# Patient Record
Sex: FEMALE | NOT HISPANIC OR LATINO | URBAN - METROPOLITAN AREA
[De-identification: names, ages, dates, MRNs, and addresses within clinical notes are randomized per-mention and may not be internally consistent; named-entity substitution may affect disease eponyms.]

---

## 2024-10-14 DIAGNOSIS — E72.4 OTC (ORNITHINE TRANSCARBAMYLASE DEFICIENCY) (MULTI): Primary | ICD-10-CM

## 2024-10-15 ENCOUNTER — APPOINTMENT (OUTPATIENT)
Dept: PSYCHOLOGY | Facility: CLINIC | Age: 54
End: 2024-10-15

## 2024-10-15 ENCOUNTER — HOSPITAL ENCOUNTER (OUTPATIENT)
Dept: RESEARCH | Facility: HOSPITAL | Age: 54
Discharge: HOME | End: 2024-10-15

## 2024-10-15 VITALS
TEMPERATURE: 97.7 F | SYSTOLIC BLOOD PRESSURE: 112 MMHG | WEIGHT: 126.32 LBS | HEIGHT: 64 IN | BODY MASS INDEX: 21.57 KG/M2 | HEART RATE: 70 BPM | DIASTOLIC BLOOD PRESSURE: 75 MMHG | RESPIRATION RATE: 18 BRPM

## 2024-10-15 DIAGNOSIS — E72.20 UREA CYCLE METABOLISM DISORDER (MULTI): Primary | ICD-10-CM

## 2024-10-15 DIAGNOSIS — E72.4 OTC (ORNITHINE TRANSCARBAMYLASE DEFICIENCY) (MULTI): ICD-10-CM

## 2024-10-15 LAB
ALBUMIN SERPL BCP-MCNC: 4.4 G/DL (ref 3.4–5)
ALP SERPL-CCNC: 83 U/L (ref 33–110)
ALT SERPL W P-5'-P-CCNC: 13 U/L (ref 7–45)
AMMONIA PLAS-SCNC: 46 UMOL/L (ref 16–53)
ANION GAP SERPL CALC-SCNC: 11 MMOL/L (ref 10–20)
APTT PPP: 33 SECONDS (ref 27–38)
AST SERPL W P-5'-P-CCNC: 19 U/L (ref 9–39)
BILIRUB DIRECT SERPL-MCNC: 0.1 MG/DL (ref 0–0.3)
BILIRUB SERPL-MCNC: 0.8 MG/DL (ref 0–1.2)
BUN SERPL-MCNC: 12 MG/DL (ref 6–23)
CALCIUM SERPL-MCNC: 9.7 MG/DL (ref 8.6–10.6)
CHLORIDE SERPL-SCNC: 104 MMOL/L (ref 98–107)
CHOLEST SERPL-MCNC: 185 MG/DL (ref 0–199)
CK SERPL-CCNC: 153 U/L (ref 0–215)
CO2 SERPL-SCNC: 30 MMOL/L (ref 21–32)
CREAT SERPL-MCNC: 0.67 MG/DL (ref 0.5–1.05)
EGFRCR SERPLBLD CKD-EPI 2021: >90 ML/MIN/1.73M*2
ERYTHROCYTE [DISTWIDTH] IN BLOOD BY AUTOMATED COUNT: 13 % (ref 11.5–14.5)
FIBRINOGEN PPP-MCNC: 249 MG/DL (ref 200–400)
GGT SERPL-CCNC: 17 U/L (ref 5–55)
GLUCOSE SERPL-MCNC: 95 MG/DL (ref 74–99)
HCT VFR BLD AUTO: 36.2 % (ref 36–46)
HGB BLD-MCNC: 11.9 G/DL (ref 12–16)
INR PPP: 1 (ref 0.9–1.1)
LDH SERPL L TO P-CCNC: 157 U/L (ref 84–246)
MAGNESIUM SERPL-MCNC: 2.16 MG/DL (ref 1.6–2.4)
MCH RBC QN AUTO: 28.7 PG (ref 26–34)
MCHC RBC AUTO-ENTMCNC: 32.9 G/DL (ref 32–36)
MCV RBC AUTO: 87 FL (ref 80–100)
MUCOUS THREADS #/AREA URNS AUTO: ABNORMAL /LPF
NRBC BLD-RTO: 0 /100 WBCS (ref 0–0)
PHOSPHATE SERPL-MCNC: 3.9 MG/DL (ref 2.5–4.9)
PLATELET # BLD AUTO: 217 X10*3/UL (ref 150–450)
POTASSIUM SERPL-SCNC: 4.8 MMOL/L (ref 3.5–5.3)
PREALB SERPL-MCNC: 23.6 MG/DL (ref 18–40)
PROT SERPL-MCNC: 7.2 G/DL (ref 6.4–8.2)
PROTHROMBIN TIME: 11 SECONDS (ref 9.8–12.8)
RBC # BLD AUTO: 4.15 X10*6/UL (ref 4–5.2)
RBC #/AREA URNS AUTO: ABNORMAL /HPF
SODIUM SERPL-SCNC: 140 MMOL/L (ref 136–145)
TRIGL SERPL-MCNC: 54 MG/DL (ref 0–149)
URATE SERPL-MCNC: 3.2 MG/DL (ref 2.3–6.7)
WBC # BLD AUTO: 3.3 X10*3/UL (ref 4.4–11.3)
WBC #/AREA URNS AUTO: ABNORMAL /HPF

## 2024-10-15 PROCEDURE — 81001 URINALYSIS AUTO W/SCOPE: CPT | Performed by: MEDICAL GENETICS

## 2024-10-15 PROCEDURE — 84478 ASSAY OF TRIGLYCERIDES: CPT | Performed by: MEDICAL GENETICS

## 2024-10-15 PROCEDURE — 82139 AMINO ACIDS QUAN 6 OR MORE: CPT | Performed by: MEDICAL GENETICS

## 2024-10-15 PROCEDURE — 82140 ASSAY OF AMMONIA: CPT | Performed by: MEDICAL GENETICS

## 2024-10-15 PROCEDURE — 85027 COMPLETE CBC AUTOMATED: CPT | Performed by: MEDICAL GENETICS

## 2024-10-15 PROCEDURE — 85610 PROTHROMBIN TIME: CPT | Performed by: MEDICAL GENETICS

## 2024-10-15 PROCEDURE — 85384 FIBRINOGEN ACTIVITY: CPT | Performed by: MEDICAL GENETICS

## 2024-10-15 PROCEDURE — 85730 THROMBOPLASTIN TIME PARTIAL: CPT | Performed by: MEDICAL GENETICS

## 2024-10-15 PROCEDURE — 83921 ORGANIC ACID SINGLE QUANT: CPT | Performed by: MEDICAL GENETICS

## 2024-10-15 PROCEDURE — 84134 ASSAY OF PREALBUMIN: CPT | Performed by: MEDICAL GENETICS

## 2024-10-15 NOTE — RESEARCH NOTES
[facility]  Study Name:  A Longitudinal Study of Urea Cycle Disorders (UCDC)  IRB#:  [id number]  [facility]#:  [id number]  Protocol Version Dated:  23-Mar-2020  PI:  Chiquita Dawn    Time point: Blood Draw Visits    Encounter Date: 10/15/2024  Encounter Time:  9:00 AM EDT  Encounter Department: [facility] HEMATOLOGY AND ONCOLOGY     #1    Phone Pager   Nawaf Batista [phone]    In the event of an emergency if the covering physician cannot be reached page the Metabolic Service Pager @ [phone]    Visit Provider: [id number], [facility] ROOM 13 [facility]   Nadya Mcgill is a 54 y.o. female and is here for a Research clinical visit.    Allergies: Not on File    Study Regimen and Dosing   The purpose of this study is to perform a long-term follow-up of a large group of patients with the various urea cycle disorders, (UCD). Urea cycle disorders are a group of rare inborn errors of metabolism that commonly present in childhood with episodes of vomiting, lethargy and coma. Symptoms are the result of an accumulation of ammonia, a toxic product of protein degradation, which is not adequately metabolized in the liver of affected individuals due to an enzyme deficiency present from birth. This study will assess biochemical status, growth and cognitive function over time, as well as evaluation of treatment outcomes of the two most commonly used forms of treatment, alternate pathway therapy and liver transplantation. The overall goal of this study is to improve treatment and outcomes of this devastating group of disorders.     Dietary Guidelines   Patient will arrive at [facility] fasting for blood draw.  Do not give patient any food unless approved by parent or dietician.     Prior to Starting Study Activities    will obtain consent if not already obtained prior to any study procedures.   If patient is taking an Alternate Pathway Med, Nursing will obtain date and time  "of last dose   Neuropsychological testing visits: DCRU RN will set up participant's room with a large round table and 2 straight back chairs (workroom chairs). Place sign on door of patient's room stating 'Quiet-Testing in Progress'  Perform venipuncture for sample collection procedure.      Objective   Vital Signs:   Vitals:    10/15/24 0937   BP: 112/75   Pulse: 70   Resp: 18   Temp: 36.5 °C (97.7 °F)   TempSrc: Oral   Weight: 57.3 kg (126 lb 5.2 oz)   Height: 1.624 m (5' 3.94\")       ASSESSMENT and PLAN:  Problem List Items Addressed This Visit    None  Visit Diagnoses       OTC (ornithine transcarbamylase deficiency) (Multi)        Relevant Orders    Amino Acids, Plasma by LC-MS/MS    Amino Acids, Urine, Quantitative    Ammonia (Completed)    aPTT (Completed)    CBC (Completed)    Fibrinogen (Completed)    Orotic Acid, Random Urine    Prealbumin (Completed)    Protime-INR (Completed)    Study 23 Panel (Completed)    Microscopic Only, Urine (Completed)            Medications as of the completion of today's visit:      Orders placed during today's visit:  Orders Placed This Encounter   Procedures    Amino Acids, Plasma by LC-MS/MS     Standing Status:   Standing     Number of Occurrences:   1     Order Specific Question:   Release result to MyChart     Answer:   Immediate [1]    Amino Acids, Urine, Quantitative     Standing Status:   Standing     Number of Occurrences:   1     Order Specific Question:   Release result to MyChart     Answer:   Immediate [1]    Ammonia     Standing Status:   Standing     Number of Occurrences:   1     Order Specific Question:   Release result to MyChart     Answer:   Immediate [1]    aPTT     Standing Status:   Standing     Number of Occurrences:   1     Order Specific Question:   Release result to MyChart     Answer:   Immediate [1]    CBC     Standing Status:   Standing     Number of Occurrences:   1     Order Specific Question:   Release result to MyChart     Answer:   Immediate " [1]    Fibrinogen     Standing Status:   Standing     Number of Occurrences:   1     Order Specific Question:   Release result to MyChart     Answer:   Immediate [1]    Orotic Acid, Random Urine     Standing Status:   Standing     Number of Occurrences:   1     Order Specific Question:   Release result to MyChart     Answer:   Immediate [1]    Prealbumin     Standing Status:   Standing     Number of Occurrences:   1     Order Specific Question:   Release result to MyChart     Answer:   Immediate [1]    Protime-INR     Standing Status:   Standing     Number of Occurrences:   1     Order Specific Question:   Release result to MyChart     Answer:   Immediate [1]    Study 23 Panel     Standing Status:   Standing     Number of Occurrences:   1     Order Specific Question:   Release result to MyChart     Answer:   Immediate [1]    Microscopic Only, Urine     Standing Status:   Standing     Number of Occurrences:   1     Order Specific Question:   Release result to MyChart     Answer:   Immediate [1]    Adult diet Regular     Standing Status:   Standing     Number of Occurrences:   1     Order Specific Question:   Diet type     Answer:   Regular       [id number] - UCDC 5101    Patient has no adverse events documented in the Research Adverse Events activity.      Sequence of Testing   Perform study visit activities in this order:  Blood Draw (all labs may not be ordered)  Height, Weight, Vital Signs. Head Circumference, if required  Neuropsychological testing, if required  Physical Exam     Optional Alternative Pathway Medication    Time of Last Dose:      10- 9 am     Clinical Lab  10:14    will provide completed requisitions required for this visit  Blood must be obtained before the next dose of the Alternate Pathway Med  Patient must be fasting at least 3 hours prior to blood draw  (na)   Specimen Test        **All labs are optional** Tube Handling   Ammonia- If ordered, obtain this blood first.    Blood needs to be drawn flowing well (best without tourniquet) or minimize time that tourniquet is on as this may affect ammonia level. Ideally, place tourniquet on, identify vein, insert needle of butterfly then remove tourniquet while drawing this blood. You may replace tourniquet to draw remaining bloods. 1 mL  Lithium Heparin Green Top Tube On Ice. Deliver STAT -  Lab must process within 30 minutes   Plasma amino acids 1.5 mL  Lithium Heparin Green Top Tube  STAT   Study 23 Panel, pre-albumin 4 mL  SST STAT    CBC (fill tube to required amount) 2 mL  EDTA STAT    PT/INR, APTT, Fibrinogen - (fill tube to required amount) 2.7 mL  one FULL   Sodium Citrate STAT    Urinalysis Collection Cup STAT      Research Correlatives   As Ordered   Specimen Test Tube Handling Time   Serum (2 mL minimum, preferably 3 mL) 4 mL  SST RT, invert 5X Not needed   Spot Urine, 6 mL (minimum) Collection Cup Place on ice 10:14     Vital Signs   Document following Vitals:  BP      Temperature     Pulse Oximetry      Respirations          Head Circumference   If requested, Head Circumference in cm: na     Discharge Instructions   Patient may be discharged to home after study requirements completed or PK sample obtained.     Neela Clayton, RN  10/15/24

## 2024-10-15 NOTE — PROGRESS NOTES
[facility] 5101 Longitudinal Urea Cycle Disorders Study      Nadya Mcgill is a 54 y.o. female with ornithine transcarbamylase (OTC) deficiency here for the [facility] 5101 Longitudinal Study. Her last visit to our site was in 2015.    HPI:  Mrs Mcgill was diagnosed with OTC after the diagnosis of her daughter, Saritha Mcgill.     - No hospitalizations realted to hyperammonemia  - No ammonia scavengers at this time  - Takes L-citrulline 2 g PO TID (6 g total per day)  - Limits protein intake to 35 g per day from food  - No metabolic formula or essential amino acids supplementation  - Sees Dr Lucas Matthew in the Division of Genetics and Metabolics at [facility] in [facility]    PMHx:  She reports that in retrospect, she always had some degree of protein aversion with a lower appetite. She previously saw Dr Patricia Jones. Mrs. Mcgill trialed Ravicti (HPN-100) but had GI side effects and began hallucinating on this medication so discontinued after 2 weeks. Her highest recorded glutamine we have in our records was in the 800-900 umol/L range. Generally has run in the 700-800 umol/L range. Has not tried any of the newer ammonia scavenger medications like Pheburane or Olpruva. Did not take Buphenyl.      More recently, she has been experiencing increasing migraines. She had recurrent migraines with menses over the years but the migraines intensified with perimenopause. She started monitoring protein more closely and decrease protein intake to 35 g per day and this seemed to help reduce the migraines.    She has had an abdominal ultrasound and this was normal (so no evidence of liver disease).     In 8/2024, she went to a reunion for 3 days and ate more than usual including meat. She was vomiting and went to the ED the day after the reunion concluded. This was her first visit to the ED due to concerns that her ammonia may be elevated. Her ammonia was normal by the time it was checked in the ED. Did not have VANNESSA  done. She went [facility] (a [facility] ED[facility] Since she did not need to be admitted she did not need to be transferred to an adult facility.    She also reports that she also has a history of difficulty swallowing. This has been attributed due to enlargement of her thyroid or glands. This happens on and off with intermittent swelling of these structures. She saw a thyroid specialist 10 years ago. She did not require any treatment and does not take any thyroid medications.    Review of Systems:  General: some protein aversion, lower appetite. Sometimes does not sleep well due to bursitis associated discomfort. She typically sleeps for about 5-6 hours per night.  Eyes: wears glasses  Hearing: no concerns  ENT: no problems except intermittent swelling of thyroid or glands that lead to some difficulty with swallowing (cause is unclear)  Resp: no problems currently but has an Albuterol MDI due to hx of asthma  Breast: L breast tender; needs to repeat mammogram of L breast. May be due to hormonal changes of perimenopause but will have it checked again just in case.  CV: no problems  GI: GERD; no constipation, diarrhea, or abdominal pain  G/U: Perimenopausal- spotting occasionally but no periods. Had renal stones in the past but not a recent problem.  Skin:  precancerous mole removed (premelanoma) in July 2024; had to have an initial biopsy then return for a revision.  Endo: intermittent thyroid (or glandular) swelling that affects swallowing; no problems with thyroid function (not on any medication for this); normal blood glucose  Musculoskeletal: Pain in left shoulder due to bursitis (needs to do PT). Also tore tissue in right shoulder and lead to her over compensating with left arm (leading to bursitis). Also has arthritis. She needs PT for both arms.  Neuro: Migraines (increased with perimenopause). Also notices more eye strain at work and that can trigger headaches. Some migrains triggered by not  "sleeping properly. Sometimes the headache lasts all day.  Psych: no reported problems    Family Hx:  Daughter (Saritha) 21 y/o- OTC deficiency s/p liver transplant in 2016  Mother- suspected that she may have had OTC deficiency with symptomatic hyperammonemia given symptoms that were assumed to be psychiatric in the [place]   Sister (35 y)- +OTC  Sister (46 y)- +OTC    Cancer also runs in the family     Objective   Visit Vitals  /75 (BP Location: Right arm)   Pulse 70   Temp 36.5 °C (97.7 °F) (Oral)   Resp 18   Ht 1.624 m (5' 3.94\")   Wt 57.3 kg (126 lb 5.2 oz)   BMI 21.73 kg/m²   BSA 1.61 m²      Physical Exam  General: NAD, cooperative, A&Ox3.  HEENT: NC/AT. PERRLA, EOMI. Ears normally formed; TM's clear bilaterally. No dysmorphic facial features. MMM and pink. OP clear. Palate intact. Dentition normal.   Neck: Supple, no LAD. Thyroid is normal  Chest: No deformities. Symmetric.   Respiratory: CTAB s C/W/R/R, no distress.  CV: RRR with normal S1/S2. No M/C/G/R. CR<2s. WWP.  Abdomen: Soft, nontender, nondistended, bowel sounds present in all quadrants. No HSM, no masses.   G/U: N/E  Extremities: FROM, symmetric and normally formed upper and lower extremities. Normal hands and feet. WWP. CR<2s. No E/C/C.  Skin: No rashes or lesions.   Neurologic: CN 2-12 are grossly intact. No focal deficits. Muscle strength is 5/5 throughout. DTR's 2/4 throughout. Gait is normal. Sensation normal. Coordination normal. Normal proprioception.  Back: no curvatures or defects    Medications:  L-citrulline 2 g PO TID (6 g total per day)  Albuterol MDI prn wheezing  Vitamin D3 25 mg PO daily  Vitamin C 1000 mg PO daily  EpiPen prn allergic reaction    Allergies:  Penicillin  Seafood      Relevant Results  Results for orders placed or performed during the hospital encounter of 10/15/24 (from the past 24 hour(s))   aPTT   Result Value Ref Range    aPTT 33 27 - 38 seconds   CBC   Result Value Ref Range    WBC 3.3 (L) 4.4 - 11.3 " x10*3/uL    nRBC 0.0 0.0 - 0.0 /100 WBCs    RBC 4.15 4.00 - 5.20 x10*6/uL    Hemoglobin 11.9 (L) 12.0 - 16.0 g/dL    Hematocrit 36.2 36.0 - 46.0 %    MCV 87 80 - 100 fL    MCH 28.7 26.0 - 34.0 pg    MCHC 32.9 32.0 - 36.0 g/dL    RDW 13.0 11.5 - 14.5 %    Platelets 217 150 - 450 x10*3/uL   Fibrinogen   Result Value Ref Range    Fibrinogen 249 200 - 400 mg/dL   Prealbumin   Result Value Ref Range    Prealbumin 23.6 18.0 - 40.0 mg/dL   Protime-INR   Result Value Ref Range    Protime 11.0 9.8 - 12.8 seconds    INR 1.0 0.9 - 1.1   Study 23 Panel   Result Value Ref Range    Glucose 95 74 - 99 mg/dL    Urea Nitrogen 12 6 - 23 mg/dL    Sodium 140 136 - 145 mmol/L    Potassium 4.8 3.5 - 5.3 mmol/L    Chloride 104 98 - 107 mmol/L    Bicarbonate 30 21 - 32 mmol/L    Anion Gap 11 10 - 20 mmol/L    Calcium 9.7 8.6 - 10.6 mg/dL    Phosphorus 3.9 2.5 - 4.9 mg/dL    Magnesium 2.16 1.60 - 2.40 mg/dL    Creatinine 0.67 0.50 - 1.05 mg/dL    eGFR >90 >60 mL/min/1.73m*2    Uric Acid 3.2 2.3 - 6.7 mg/dL    AST 19 9 - 39 U/L     84 - 246 U/L    ALT 13 7 - 45 U/L    GGT 17 5 - 55 U/L    Alkaline Phosphatase 83 33 - 110 U/L    Bilirubin, Total 0.8 0.0 - 1.2 mg/dL    Bilirubin, Direct 0.1 0.0 - 0.3 mg/dL    Total Protein 7.2 6.4 - 8.2 g/dL    Albumin 4.4 3.4 - 5.0 g/dL    Cholesterol 185 0 - 199 mg/dL    Triglycerides 54 0 - 149 mg/dL    Creatine Kinase 153 0 - 215 U/L   Microscopic Only, Urine   Result Value Ref Range    WBC, Urine 6-10 (A) 1-5, NONE /HPF    RBC, Urine NONE NONE, 1-2, 3-5 /HPF    Mucus, Urine FEW Reference range not established. /LPF      Assessment/Plan   Nadya Mcgill is a 54 y.o. female with ornithine transcarbamylase (OTC) deficiency here for the [facility] 5101 Longitudinal Study. Her last visit to our site was in 2015.    Mrs Mcgill was diagnosed with OTC after the diagnosis of her daughter, Saritha Mcgill.     - No hospitalizations realted to hyperammonemia  - Highest glutamine known was in the 800-900 umol/L range  (usually runs between 700-800 umol/L)  - No ammonia scavengers at this time (had hallucinations and GI side effects after 2 weeks on Ravicti (HPN-100)  - Takes L-citrulline 2 g PO TID (6 g total per day)  - Limits protein intake to 35 g per day from food  - No metabolic formula or essential amino acids supplementation  - Sees Dr Lucas Matthew in the Division of Genetics and Metabolics at [facility] in [facility]    Plan:  1) [place] [address] labs collected (blood and urine)  2) Neuropsychiatric testing with Dr Huang for the [place] [address] study  3) For clinical care, Mrs Mcgill will follow-up with Dr Matthew at [facility]   4) Next study visit will be in 1 year     Chiquita Dawn MD    Total time: 80 minutes

## 2024-10-17 LAB
(HCYS)2 SERPL QL: <5 UMOL/L
A-AMINOBUTYR SERPL QL: 29.5 UMOL/L
AAA SERPL-SCNC: <5 UMOL/L
ALANINE SERPL-SCNC: 289.3 UMOL/L (ref 160–530)
ALLOISOLEUCINE SERPL QL: <5 UMOL/L
ANSERINE SERPL-SCNC: <20 UMOL/L
ARGININE SERPL-SCNC: 45 UMOL/L (ref 35–125)
ARGININOSUCCINATE SERPL-SCNC: <20 UMOL/L
ASPARAGINE/CREAT UR-RTO: 42.6 UMOL/L (ref 20–80)
ASPARTATE SERPL-SCNC: <5 UMOL/L
B-AIB SERPL-SCNC: <5 UMOL/L
B-ALANINE SERPL-SCNC: <5 UMOL/L
CITRULLINE SERPL-SCNC: 22.6 UMOL/L (ref 10–45)
CYSTATHIONIN SERPL-SCNC: <5 UMOL/L
CYSTINE SERPL-SCNC: 51.1 UMOL/L (ref 10–65)
ETHANOLAMINE SERPL-SCNC: <5 UMOL/L
GABA SERPL-SCNC: <5 UMOL/L
GLUTAMATE SERPL-SCNC: 28.2 UMOL/L (ref 15–130)
GLUTAMINE SERPL-SCNC: 850.3 UMOL/L (ref 380–680)
GLYCINE SERPL-SCNC: 303 UMOL/L (ref 140–420)
HISTIDINE SERPL-SCNC: 57.4 UMOL/L (ref 50–130)
HOMOCITRULLINE SERPL-SCNC: <5 UMOL/L
ISOLEUCINE SERPL-SCNC: 42 UMOL/L (ref 30–120)
LEUCINE SERPL QL: 68.3 UMOL/L (ref 60–180)
LYSINE SERPL-ACNC: 159.8 UMOL/L (ref 85–230)
METHIONINE SERPL-SCNC: 24.4 UMOL/L (ref 15–40)
OH-LYSINE SERPL-SCNC: <5 UMOL/L
OH-PROLINE SERPL-SCNC: 9.2 UMOL/L (ref 5–40)
ORNITHINE SERPL-SCNC: 53.1 UMOL/L (ref 25–110)
PATH REV BLD -IMP: ABNORMAL
PHE SERPL-SCNC: 45 UMOL/L (ref 30–82)
PHE/TYR RATIO: 0.8
PROLINE SERPL-SCNC: 141.8 UMOL/L (ref 90–350)
SARCOSINE SERPL-SCNC: <5 UMOL/L
SERINE/CREAT UR-RTO: 124.7 UMOL/L (ref 60–170)
TAURINE SERPL-SCNC: 49.8 UMOL/L (ref 30–130)
THREONINE SERPL-SCNC: 95 UMOL/L (ref 60–190)
TRYPTOPHAN SERPL QL: 42 UMOL/L (ref 25–80)
TYROSINE SERPL-SCNC: 57.4 UMOL/L (ref 35–110)
VALINE SERPL-SCNC: 155.5 UMOL/L (ref 120–320)

## 2024-10-20 LAB
A-AMINOBUTYR/CREAT UR-RTO: 18 UMOL/G CRT
AAA/CREAT UR-RTO: 25 UMOL/G CRT
ALANINE/CREAT UR-RTO: 196 UMOL/G CRT (ref 60–500)
AMINO ACID PAT UR-IMP: NORMAL
ANSERINE/CREAT UR-RTO: <25 UMOL/G CRT
ARGININE/CREAT UR-RTO: 28 UMOL/G CRT
ARGININOSUCCINATE/CREAT UR-RTO: <10 UMOL/G CRT
ASPARAGINE/CREAT UR-RTO: 67 UMOL/G CRT (ref 25–180)
ASPARTATE/CREAT UR-RTO: <25 UMOL/G CRT
B-AIB/CREAT UR-RTO: 87 UMOL/G CRT
B-ALANINE/CREAT UR-RTO: <125 UMOL/G CRT
CITRULLINE/CREAT UR-RTO: <5 UMOL/G CRT
CREAT UR-MCNC: 57 MG/DL
CYSTATHIONIN/CREAT UR-RTO: <25 UMOL/G CRT
CYSTINE/CREAT UR-RTO: 32 UMOL/G CRT
ETHANOLAMINE/CREAT UR-RTO: 318 UMOL/G CRT (ref 100–510)
GABA/CREAT UR-RTO: <25 UMOL/G CRT
GLUTAMATE/CREAT UR-RTO: <25 UMOL/G CRT
GLUTAMINE/CREAT UR-RTO: 556 UMOL/G CRT (ref 100–665)
GLYCINE/CREAT UR-RTO: 1538 UMOL/G CRT (ref 230–3510)
HISTIDINE/CREAT UR-RTO: 573 UMOL/G CRT (ref 80–1130)
HOMOCITRULLINE/CREAT UR-RTO: <25 UMOL/G CRT
ISOLEUCINE/CREAT UR-RTO: <25 UMOL/G CRT
LEUCINE/CREAT UR-RTO: 15 UMOL/G CRT
LYSINE/CREAT UR-RTO: 66 UMOL/G CRT
METHIONINE/CREAT UR-RTO: <10 UMOL/G CRT
OH-LYSINE/CREAT UR-RTO: <25 UMOL/G CRT
OH-PROLINE/CREAT UR-RTO: <10 UMOL/G CRT
ORNITHINE/CREAT UR-RTO: <25 UMOL/G CRT
PHE/CREAT UR-RTO: 37 UMOL/G CRT (ref 15–85)
PROLINE/CREAT UR-RTO: <10 UMOL/G CRT
SARCOSINE/CREAT UR-RTO: <25 UMOL/G CRT
SERINE/CREAT UR-RTO: 337 UMOL/G CRT (ref 90–470)
TAURINE/CREAT UR-RTO: 895 UMOL/G CRT
THREONINE/CREAT UR-RTO: 92 UMOL/G CRT (ref 25–250)
TRYPTOPHAN/CREAT UR-RTO: 54 UMOL/G CRT (ref 15–95)
TYROSINE/CREAT UR-RTO: 70 UMOL/G CRT (ref 15–150)
VALINE/CREAT UR-RTO: 28 UMOL/G CRT

## 2024-10-22 NOTE — PROGRESS NOTES
Subjective   Patient ID: Nadya Mcgill is a 54 y.o. female who presents for Longitudinal Urea Cycle Disorder Research Study 5101.    Reason For Visit  Nadya participated in a neuropsychological evaluation as part of the Urea Cycle Disorder.     Tests  Nadya was seen for 2.5 hours of testing and scoring and 1 hour of feedback and report writing.     Assessment/Plan   Nadya is a 54 year old woman with a history of Urea Cycle Disorder.   She presented for in person neuropsychological evaluation as part of the UCD Longitudinal Study.  She completed testing and results were shared with her.  A letter will be sent to her and shared with the study sponsor.     Diagnoses and all orders for this visit:  Urea cycle metabolism disorder (Multi)    Direct Time  11:30-12:45 (testing)  1:00-1:15 (scoring)  2:30-3:30 (testing)  3:30-3:45 (feedback)    Indirect Time  45 Report writing    1 unit 54859; 1 unit 20688  1 unit 94507; 2 units 31133  1 unit 89999    Elma Huang, PhD 10/21/24 9:51 PM

## 2024-10-23 LAB
ANNOTATION COMMENT IMP: NORMAL
CREAT UR-MCNC: 57 MG/DL
OROTATE/CREAT UR-SRTO: 1.3 MMOL/MOL CRT

## 2024-11-05 NOTE — ADDENDUM NOTE
Encounter addended by: Agnes Balderas RN on: 11/5/2024 5:31 PM   Actions taken: Charge Capture section accepted

## 2025-03-16 NOTE — ADDENDUM NOTE
Encounter addended by: Chiquita Dawn MD on: 3/16/2025 12:58 AM   Actions taken: Allergies modified, Allergies reviewed, Order Reconciliation Section accessed, Actions taken from an [employer] Advisory, Problem List reviewed, Problem List modified, Visit diagnoses modified, SmartForm saved, Follow-up modified, Level of Service modified, Pend clinical note, Clinical Note Signed

## 2025-03-16 NOTE — PATIENT INSTRUCTIONS
Plan:  1) [place] [address] labs collected (blood and urine)  2) Neuropsychiatric testing with Dr Huang for the [place] [address] study  3) For clinical care, Mrs Mcgill will follow-up with Dr Matthew at [facility]   4) Next study visit will be in 1 year